# Patient Record
Sex: MALE | Race: BLACK OR AFRICAN AMERICAN | URBAN - METROPOLITAN AREA
[De-identification: names, ages, dates, MRNs, and addresses within clinical notes are randomized per-mention and may not be internally consistent; named-entity substitution may affect disease eponyms.]

---

## 2020-04-15 ENCOUNTER — TELEPHONE (OUTPATIENT)
Dept: PHARMACY | Facility: CLINIC | Age: 20
End: 2020-04-15

## 2020-04-15 RX ORDER — LISINOPRIL 10 MG/1
1 TABLET ORAL DAILY
COMMUNITY
Start: 2020-02-09 | End: 2021-05-26

## 2020-04-15 NOTE — TELEPHONE ENCOUNTER
DOCUMENTATION ONLY:   Truvada co-pay coupon card information:   RxBIN: [id number]  RxPCN: [id number]  RxGRP: [id number]  ISSUER: ([id number])  ID: [id number]

## 2020-04-15 NOTE — TELEPHONE ENCOUNTER
DOCUMENTATION ONLY:   Truvada does not require prior authorization   Estimated Co-Pay: $1900.13  Forwarded to Financial Assistance for co-pay card

## 2020-05-08 ENCOUNTER — TELEPHONE (OUTPATIENT)
Dept: PHARMACY | Facility: CLINIC | Age: 20
End: 2020-05-08

## 2020-05-08 NOTE — TELEPHONE ENCOUNTER
Refill call regarding Truvada from [facility]. Shipping out Truvada on  for 5/15 arrival with patients consent. Copay of $0 @ 004. Address and  confirmed.

## 2020-06-08 ENCOUNTER — TELEPHONE (OUTPATIENT)
Dept: PHARMACY | Facility: CLINIC | Age: 20
End: 2020-06-08

## 2020-06-08 NOTE — TELEPHONE ENCOUNTER
Refill call regarding truvada at [facility]. Copay $0. Patient is in need of a refill, will ship  to arrive  with patient consent. Patient has not started any new medications including OTC drugs. Patient has not had any medication/ dose or instruction changes. No new allergies or side effects reported with this shipment. Medication is being taken as prescribed by physician and properly stored. Two patient identifiers:  and Address verified. Patient has no questions or concerns for [facility]. Patient has 11 days left on hand.

## 2020-07-02 ENCOUNTER — TELEPHONE (OUTPATIENT)
Dept: PHARMACY | Facility: CLINIC | Age: 20
End: 2020-07-02

## 2020-07-07 NOTE — TELEPHONE ENCOUNTER
Patient returned call for Descovy initial consult for PrEP modernization.  Patient has two weeks of Truvada on hand and as there is no clinical reason to not permit him to finish this supply.  Patient will be called in approx 7 days for initial shipment of Descovy.  Patient is well experienced to PrEP and has been on Truvada as PrEP for approx 2 years.     Descovy counseling complete. Name/ confirmed. Consult included: indication; goals of treatment; administration; storage and handling; side effects; how to handle missed doses; the importance of compliance; the importance of maintaining lab appts and follow up appts w\ MD; safe sex practices with condoms. Patient understands that medication alone is not primary HIV prophylaxis. Patient understands this medication will only serve as protection for HIV and no other STDs. Patient understands to contact [facility] and MD for any medication changes due to drug interactions. He understands the importance of 3month regular testing. He understands the importance of ONCE daily dosing due to the potential renal effects of tenofovir. All questions answered and addressed to the patients satisfaction.    Discussed the importance of staying well hydrated while on therapy. Compliance stressed - patient to take missed doses as soon as remembered, but NOT to take 2 doses in one day. Patient will report questions or concerns to myself or practitioner. Patient verbalizes understanding. I will personally f/u with patient in 2 weeks from start, and [facility] will contact patient in 3 weeks to coordinate next refill.     Approximately 15 mins spent with the patient on medication education.     Will f/u with patient in 7 days for shipment.     PAULETTE Hidalgo.Ph., AAHIVP  Clinical Pharmacist, HIV/HCV  [facility]  Phone: [phone]

## 2020-07-07 NOTE — TELEPHONE ENCOUNTER
Patient called for initial consult and ship on Descovy as PrEP - patient is changing from Truvada as PrEP as part of PrEP modernization - na lvm.     Urban Santana, R.Ph., AAHIVP  Clinical Pharmacist, HIV/HCV  [facility]  Phone: [phone]

## 2020-07-20 NOTE — TELEPHONE ENCOUNTER
Incoming call - Patient called [facility] back in order to set up shipment for Descovy. Patient states that he has 2 doses of Truvada remaining and will, therefore, begin Descovy as PrEP on Wed, 7/22. Descovy initial was previously completed on 7/7. Patient did NOT have any additional questions in regards to Descovy treatment at this time. Patient confirms no missed doses, no new meds, and no new allergies. Descovy will be shipped out on 7/20 for 7/21 delivery. Address confirmed. [facility] will f/u for refill call when patient has 7 doses remaining.

## 2020-07-20 NOTE — TELEPHONE ENCOUNTER
Initial Descovy Shipment attempted (attempt 1). NA, LVM for call back. Will f/u if no call back. $0 copay.   - Initial consultation completed on 7/7/20. Patient was to complete last 2 weeks of Truvada before beginning Descovy.

## 2020-08-12 ENCOUNTER — TELEPHONE (OUTPATIENT)
Dept: PHARMACY | Facility: CLINIC | Age: 20
End: 2020-08-12

## 2020-09-15 ENCOUNTER — TELEPHONE (OUTPATIENT)
Dept: PHARMACY | Facility: CLINIC | Age: 20
End: 2020-09-15

## 2020-09-25 NOTE — TELEPHONE ENCOUNTER
Refill call for Descovy. Patient confirmed need of the refill. Will deliver via [employer] on  to arrive on 10/1with patient consent. Copay $0 at 004 with auth. Address confirmed ([place]). Patient has 7 doses remaining (7 day supply). Patient denies no side effects. Medication states he missed 2 to 3 doses due to moving. He states he has not missed doses on the 2 years he has been on therapy.  He states the medication was lost in the move and once he found the medication, he resumed therapy. He has not missed a dose since. Encouraged use of an alarm for an adherence tool. No f/u intervention needed as this is the first occurrence of missed doses. No new medications/allergies/medical conditions. No ER/Urgent care visits in past month. Patient taking the medication as directed. Patient denies any further questions. Confirmed 2 patient identifiers - Name and .     Yony Ponce, PharmD  [facility]

## 2020-10-27 ENCOUNTER — TELEPHONE (OUTPATIENT)
Dept: PHARMACY | Facility: CLINIC | Age: 20
End: 2020-10-27

## 2020-12-01 ENCOUNTER — SPECIALTY PHARMACY (OUTPATIENT)
Dept: PHARMACY | Facility: CLINIC | Age: 20
End: 2020-12-01

## 2020-12-01 NOTE — TELEPHONE ENCOUNTER
Specialty Pharmacy - Refill Coordination    Specialty Medication Orders Linked to Encounter      Most Recent Value   Medication #1  emtricitabine-tenofovir alafen (DESCOVY) 200-25 mg Tab (Order#[id number], Rx#[id number])          Refill Questions - Documented Responses      Most Recent Value   Relationship to patient of person spoken to?  Self   HIPAA/medical authority confirmed?  Yes   Any changes in contact preferences or allowed representatives?  No   Has the patient had any insurance changes?  No   Has the patient had any changes to specialty medication, dose, or instructions?  No   Has the patient started taking any new medications, herbals, or supplements?  No   Has the patient been diagnosed with any new medical conditions?  No   Does the patient have any new allergies to medications or foods?  No   Does the patient have any concerns about side effects?  No   Can the patient store medication/sharps container properly (at the correct temperature, away from children/pets, etc.)?  Yes   Can the patient call emergency services (911) in the event of an emergency?  Yes   Does the patient have any concerns or questions about taking or administering this medication as prescribed?  No   How many doses did the patient miss in the past 4 weeks or since the last fill?  0   How many doses does the patient have on hand?  6   How many days does the patient report on hand quantity will last?  6   Does the number of doses/days supply remaining match pharmacy expected amounts?  Yes   Does the patient feel that this medication is effective?  Yes   During the past 4 weeks, has patient missed any activities due to condition or medication?  No   During the past 4 weeks, did patient have any of the following urgent care visits?  None   How will the patient receive the medication?  Mail   When does the patient need to receive the medication?  12/08/20   Shipping Address  Prescription   Address in [facility] confirmed and  updated if neccessary?  Yes   Expected Copay ($)  0   Is the patient able to afford the medication copay?  Yes   Payment Method  zero copay   Days supply of Refill  30   Would patient like to speak to a pharmacist?  No   Do you want to trigger an intervention?  No   Do you want to trigger an additional referral task?  No   Refill activity completed?  Yes   Refill activity plan  Refill scheduled   Shipment/Pickup Date:  12/03/20          Current Outpatient Medications   Medication Sig    emtricitabine-tenofovir alafen (DESCOVY) 200-25 mg Tab Take 1 tablet by mouth once daily.    lisinopriL 10 MG tablet Take 1 tablet by mouth once daily.   Last reviewed on 4/15/2020 10:45 AM by Myriam Vega PharmD    Review of patient's allergies indicates:  No Known Allergies Last reviewed on  4/15/2020 10:45 AM by Myriam Vega      Tasks added this encounter   12/29/2020 - Refill Call (Auto Added)   Tasks due within next 3 months   2/7/2021 - Clinical - Follow Up Assesement (90 day)     Yony Ponce PharmD  [facility]  [address] [place] [id number]  Phone: [phone]  Fax: [phone]

## 2021-01-07 ENCOUNTER — SPECIALTY PHARMACY (OUTPATIENT)
Dept: PHARMACY | Facility: CLINIC | Age: 21
End: 2021-01-07

## 2021-02-04 ENCOUNTER — SPECIALTY PHARMACY (OUTPATIENT)
Dept: PHARMACY | Facility: CLINIC | Age: 21
End: 2021-02-04

## 2021-03-09 ENCOUNTER — SPECIALTY PHARMACY (OUTPATIENT)
Dept: PHARMACY | Facility: CLINIC | Age: 21
End: 2021-03-09

## 2021-04-03 ENCOUNTER — SPECIALTY PHARMACY (OUTPATIENT)
Dept: PHARMACY | Facility: CLINIC | Age: 21
End: 2021-04-03

## 2021-05-18 ENCOUNTER — SPECIALTY PHARMACY (OUTPATIENT)
Dept: PHARMACY | Facility: CLINIC | Age: 21
End: 2021-05-18

## 2021-05-26 ENCOUNTER — TELEPHONE (OUTPATIENT)
Dept: PHARMACY | Facility: CLINIC | Age: 21
End: 2021-05-26

## 2021-05-26 RX ORDER — ISOTRETINOIN 10 MG/1
10 CAPSULE ORAL 2 TIMES DAILY
COMMUNITY

## 2021-06-21 ENCOUNTER — SPECIALTY PHARMACY (OUTPATIENT)
Dept: PHARMACY | Facility: CLINIC | Age: 21
End: 2021-06-21

## 2021-07-29 ENCOUNTER — SPECIALTY PHARMACY (OUTPATIENT)
Dept: PHARMACY | Facility: CLINIC | Age: 21
End: 2021-07-29

## 2021-08-26 ENCOUNTER — SPECIALTY PHARMACY (OUTPATIENT)
Dept: PHARMACY | Facility: CLINIC | Age: 21
End: 2021-08-26

## 2021-09-11 ENCOUNTER — SPECIALTY PHARMACY (OUTPATIENT)
Dept: PHARMACY | Facility: CLINIC | Age: 21
End: 2021-09-11

## 2021-10-08 ENCOUNTER — SPECIALTY PHARMACY (OUTPATIENT)
Dept: PHARMACY | Facility: CLINIC | Age: 21
End: 2021-10-08

## 2021-11-10 ENCOUNTER — SPECIALTY PHARMACY (OUTPATIENT)
Dept: PHARMACY | Facility: CLINIC | Age: 21
End: 2021-11-10

## 2021-12-11 ENCOUNTER — SPECIALTY PHARMACY (OUTPATIENT)
Dept: PHARMACY | Facility: CLINIC | Age: 21
End: 2021-12-11

## 2022-01-11 ENCOUNTER — SPECIALTY PHARMACY (OUTPATIENT)
Dept: PHARMACY | Facility: CLINIC | Age: 22
End: 2022-01-11

## 2022-01-12 NOTE — TELEPHONE ENCOUNTER
Patient returned call and informed [facility] he has switched to a different provider for his Descovy treatment. With this new provider and the program he is enrolled in, they are requiring him to fill with their specific preferred pharmacy. Last fill was on 1/3. Patient will let us know if he would like to switch back [facility] in the future. Closing enrollment at [facility].